# Patient Record
(demographics unavailable — no encounter records)

---

## 2024-12-08 NOTE — ASSESSMENT
[FreeTextEntry1] : #afib chronic rate controlled controlled on metoprolol XL 25 mg daily  CHADSVASC = 3-4 continue xarelto 20 mg daily   #HTN controlled continue amlodipiine/valsartan 5/160 mg daily   #mild carotid disease  continue statin  will repeat carotid next visit  #mild valve disease will repeat echo next visit   previous chart reviewed  Lab test ordered heart healthy diet , weight control and moderate exercise discussed  follow up in 3-4 m

## 2024-12-08 NOTE — HISTORY OF PRESENT ILLNESS
[FreeTextEntry1] : I have the pleasure to see Mr Rodriguez today for cardiology evaluation. He is an 82 year old male patient with PMHx of afib, HTN, MR, AI, carotid disease GERD and hypothyroidism.  Currently he is stable from cardiac standpoint, denies chest pain , sob, mejia or palpitations.  He is active at baseline  BP normal controlled EKG afib rate controlled HR ~ 70 no acute ischemic changes  Echo 3/2024 : normal EF, mild MR, mild AI , mild TR, LA moderately dilated   non smoker, no alcohol no illicit drugs

## 2024-12-08 NOTE — PHYSICAL EXAM
[No Acute Distress] : no acute distress [Normal S1, S2] : normal S1, S2 [No Rub] : no rub [No Gallop] : no gallop [Normal] : soft, non-tender, no masses/organomegaly, normal bowel sounds [Moves all extremities] : moves all extremities [Alert and Oriented] : alert and oriented [de-identified] : irregularly irregular

## 2025-02-26 NOTE — HISTORY OF PRESENT ILLNESS
[FreeTextEntry1] : I have the pleasure to see Mr Rodriguez today for cardiology evaluation. He is an 82 year old male patient with PMHx of afib, HTN, MR, AI, carotid disease GERD and hypothyroidism.    2/202/2025 LDL 79 HDL 37  Cr 1.05 HBA1C 5.9  BP normal controlled EKG afib rate controlled HR ~ 70 no acute ischemic changes  Echo 3/2024 : normal EF, mild MR, mild AI , mild TR, LA moderately dilated   Currently he is stable from cardiac standpoint, denies chest pain , sob, mejia or palpitations.  He is active at baseline  non smoker, no alcohol no illicit drugs

## 2025-02-26 NOTE — PHYSICAL EXAM
[No Acute Distress] : no acute distress [Normal S1, S2] : normal S1, S2 [No Rub] : no rub [No Gallop] : no gallop [Normal] : soft, non-tender, no masses/organomegaly, normal bowel sounds [Moves all extremities] : moves all extremities [Alert and Oriented] : alert and oriented [de-identified] : irregularly irregular

## 2025-02-26 NOTE — ASSESSMENT
[FreeTextEntry1] : #afib chronic rate controlled controlled on metoprolol XL 25 mg daily  CHADSVASC = 3-4 continue xarelto 20 mg daily   #HTN controlled continue amlodipiine/valsartan 5/160 mg daily   #mild carotid disease  continue statin  will repeat carotid prior to next visit  #mild valve disease will repeat echo prior to next visit    Lab test reviewed  heart healthy diet , weight control and moderate exercise discussed  follow up in 6 m

## 2025-02-26 NOTE — REVIEW OF SYSTEMS
[Negative] : Gastrointestinal [Fever] : no fever [Chills] : no chills [Convulsions] : no convulsions [Confusion] : no confusion was observed